# Patient Record
Sex: FEMALE | Race: WHITE | Employment: PART TIME | URBAN - METROPOLITAN AREA
[De-identification: names, ages, dates, MRNs, and addresses within clinical notes are randomized per-mention and may not be internally consistent; named-entity substitution may affect disease eponyms.]

---

## 2018-11-15 ENCOUNTER — OFFICE VISIT (OUTPATIENT)
Dept: OBGYN CLINIC | Age: 23
End: 2018-11-15
Payer: COMMERCIAL

## 2018-11-15 ENCOUNTER — HOSPITAL ENCOUNTER (OUTPATIENT)
Age: 23
Setting detail: SPECIMEN
Discharge: HOME | End: 2018-11-15
Payer: COMMERCIAL

## 2018-11-15 VITALS
SYSTOLIC BLOOD PRESSURE: 124 MMHG | WEIGHT: 242 LBS | BODY MASS INDEX: 37.98 KG/M2 | HEIGHT: 67 IN | DIASTOLIC BLOOD PRESSURE: 84 MMHG

## 2018-11-15 DIAGNOSIS — Z01.419 WOMEN'S ANNUAL ROUTINE GYNECOLOGICAL EXAMINATION: ICD-10-CM

## 2018-11-15 DIAGNOSIS — E28.2 PCOS (POLYCYSTIC OVARIAN SYNDROME): ICD-10-CM

## 2018-11-15 DIAGNOSIS — Z76.89 ENCOUNTER TO ESTABLISH CARE: Primary | ICD-10-CM

## 2018-11-15 PROCEDURE — 99385 PREV VISIT NEW AGE 18-39: CPT | Performed by: OBSTETRICS & GYNECOLOGY

## 2018-11-15 RX ORDER — ALPRAZOLAM 0.25 MG/1
0.25 TABLET ORAL
COMMUNITY
Start: 2018-07-26

## 2018-11-15 NOTE — PROGRESS NOTES
not tender. Cervix exhibits no motion tenderness, no discharge and no friability. Right adnexum displays no mass, no tenderness and no fullness. Left adnexum displays no mass, no tenderness and no fullness. No erythema or tenderness in the vagina. No foreign body in the vagina. No signs of injury around the vagina. No vaginal discharge found. Musculoskeletal: Normal range of motion. Neurological: She is alert and oriented to person, place, and time. Skin: Skin is warm and dry. Psychiatric: She has a normal mood and affect. Her behavior is normal. Judgment and thought content normal.           ASSESSMENT:     26 y.o. Female; Annual   Diagnosis Orders   1. Encounter to establish care     2. Women's annual routine gynecological examination  PAP Smear   3. PCOS (polycystic ovarian syndrome)  Hemoglobin A1C    Insulin, total    Lipid Panel     Return in about 1 year (around 11/15/2019) for annual exam.                PLAN:  - Pap collected per ASCCP Guidelines and sent for cytology only. - Birth control discussed. Will continue Lo-Estrin for now but discussed other OCPs as well as IUD for endometrial protection. Recommended scheduled NSAIDs starting 1 day prior to her menses. Will check Hg A1c, fasting insulin to determine need for metformin, as well as lipid panel.     - Smoking risk factors discussed  - Diet and exercise reviewed. - Routine health maintenance per patient's PCP.     Electronically signed by Wilian Armstrong MD on 11/15/18 at [facility]

## 2018-11-16 ENCOUNTER — HOSPITAL ENCOUNTER (OUTPATIENT)
Age: 23
Discharge: HOME | End: 2018-11-16
Payer: COMMERCIAL

## 2018-11-16 DIAGNOSIS — E28.2 PCOS (POLYCYSTIC OVARIAN SYNDROME): ICD-10-CM

## 2018-11-16 LAB
CHOLESTEROL/HDL RATIO: 4.7
CHOLESTEROL: 186 MG/DL
HDLC SERPL-MCNC: 40 MG/DL
INSULIN COMMENT: NORMAL
INSULIN REFERENCE RANGE:: NORMAL
INSULIN: 25.2 MU/L
LDL CHOLESTEROL: 128 MG/DL (ref 0–130)
TRIGL SERPL-MCNC: 90 MG/DL
VLDLC SERPL CALC-MCNC: ABNORMAL MG/DL (ref 1–30)

## 2018-11-16 PROCEDURE — 83036 HEMOGLOBIN GLYCOSYLATED A1C: CPT

## 2018-11-16 PROCEDURE — 36415 COLL VENOUS BLD VENIPUNCTURE: CPT

## 2018-11-16 PROCEDURE — 80061 LIPID PANEL: CPT

## 2018-11-16 PROCEDURE — 83525 ASSAY OF INSULIN: CPT

## 2018-11-16 ASSESSMENT — ENCOUNTER SYMPTOMS
COUGH: 0
VOMITING: 0
CONSTIPATION: 0
NAUSEA: 0
DIARRHEA: 0
WHEEZING: 0
ABDOMINAL PAIN: 0

## 2018-11-18 LAB
ESTIMATED AVERAGE GLUCOSE: 105 MG/DL
HBA1C MFR BLD: 5.3 % (ref 4–6)

## 2018-11-21 ENCOUNTER — TELEPHONE (OUTPATIENT)
Dept: OBGYN CLINIC | Age: 23
End: 2018-11-21

## 2018-11-23 RX ORDER — METFORMIN HYDROCHLORIDE 500 MG/1
500 TABLET, EXTENDED RELEASE ORAL
Qty: 30 TABLET | Refills: 5 | Status: SHIPPED | OUTPATIENT
Start: 2018-11-23

## 2018-12-03 LAB — CYTOLOGY REPORT: NORMAL

## 2019-02-05 ENCOUNTER — OFFICE VISIT (OUTPATIENT)
Dept: FAMILY MEDICINE CLINIC | Age: 24
End: 2019-02-05
Payer: COMMERCIAL

## 2019-02-05 VITALS
DIASTOLIC BLOOD PRESSURE: 72 MMHG | HEART RATE: 97 BPM | WEIGHT: 245 LBS | HEIGHT: 67 IN | SYSTOLIC BLOOD PRESSURE: 110 MMHG | BODY MASS INDEX: 38.45 KG/M2 | TEMPERATURE: 96.9 F | OXYGEN SATURATION: 98 %

## 2019-02-05 DIAGNOSIS — J02.9 SORE THROAT: ICD-10-CM

## 2019-02-05 DIAGNOSIS — J03.90 ACUTE TONSILLITIS, UNSPECIFIED ETIOLOGY: Primary | ICD-10-CM

## 2019-02-05 LAB
CONTROL: PRESENT
PREGNANCY TEST URINE, POC: NEGATIVE
S PYO AG THROAT QL: NORMAL

## 2019-02-05 PROCEDURE — 87880 STREP A ASSAY W/OPTIC: CPT | Performed by: NURSE PRACTITIONER

## 2019-02-05 PROCEDURE — 99203 OFFICE O/P NEW LOW 30 MIN: CPT | Performed by: NURSE PRACTITIONER

## 2019-02-05 PROCEDURE — 81025 URINE PREGNANCY TEST: CPT | Performed by: NURSE PRACTITIONER

## 2019-02-05 RX ORDER — PREDNISONE 20 MG/1
40 TABLET ORAL DAILY
Qty: 6 TABLET | Refills: 0 | Status: SHIPPED | OUTPATIENT
Start: 2019-02-05 | End: 2019-02-08

## 2019-02-05 RX ORDER — CLINDAMYCIN HYDROCHLORIDE 300 MG/1
300 CAPSULE ORAL 4 TIMES DAILY
Qty: 40 CAPSULE | Refills: 0 | Status: SHIPPED | OUTPATIENT
Start: 2019-02-05 | End: 2019-02-15

## 2019-02-05 ASSESSMENT — PATIENT HEALTH QUESTIONNAIRE - PHQ9
SUM OF ALL RESPONSES TO PHQ QUESTIONS 1-9: 0
SUM OF ALL RESPONSES TO PHQ9 QUESTIONS 1 & 2: 0
1. LITTLE INTEREST OR PLEASURE IN DOING THINGS: 0
2. FEELING DOWN, DEPRESSED OR HOPELESS: 0
SUM OF ALL RESPONSES TO PHQ QUESTIONS 1-9: 0

## 2019-02-05 ASSESSMENT — ENCOUNTER SYMPTOMS
VOMITING: 0
SWOLLEN GLANDS: 1
SORE THROAT: 1
COUGH: 0

## 2019-02-19 ENCOUNTER — OFFICE VISIT (OUTPATIENT)
Dept: FAMILY MEDICINE CLINIC | Age: 24
End: 2019-02-19
Payer: COMMERCIAL

## 2019-02-19 ENCOUNTER — TELEPHONE (OUTPATIENT)
Dept: FAMILY MEDICINE CLINIC | Age: 24
End: 2019-02-19

## 2019-02-19 VITALS
OXYGEN SATURATION: 98 % | WEIGHT: 243 LBS | BODY MASS INDEX: 38.14 KG/M2 | DIASTOLIC BLOOD PRESSURE: 82 MMHG | HEART RATE: 94 BPM | SYSTOLIC BLOOD PRESSURE: 124 MMHG | TEMPERATURE: 97.9 F | HEIGHT: 67 IN

## 2019-02-19 DIAGNOSIS — D22.9 CHANGE IN MOLE: ICD-10-CM

## 2019-02-19 DIAGNOSIS — Z11.4 ENCOUNTER FOR SCREENING FOR HIV: ICD-10-CM

## 2019-02-19 DIAGNOSIS — F32.9 MAJOR DEPRESSIVE DISORDER WITH CURRENT ACTIVE EPISODE, UNSPECIFIED DEPRESSION EPISODE SEVERITY, UNSPECIFIED WHETHER RECURRENT: ICD-10-CM

## 2019-02-19 DIAGNOSIS — Z00.00 PREVENTATIVE HEALTH CARE: ICD-10-CM

## 2019-02-19 DIAGNOSIS — F41.9 ANXIETY: Primary | ICD-10-CM

## 2019-02-19 DIAGNOSIS — Z83.3 FAMILY HISTORY OF DIABETES MELLITUS: ICD-10-CM

## 2019-02-19 DIAGNOSIS — E66.9 OBESITY WITH BODY MASS INDEX (BMI) OF 30.0 TO 39.9: ICD-10-CM

## 2019-02-19 DIAGNOSIS — Z11.8 SCREENING FOR CHLAMYDIAL DISEASE: ICD-10-CM

## 2019-02-19 DIAGNOSIS — E55.9 VITAMIN D DEFICIENCY: ICD-10-CM

## 2019-02-19 DIAGNOSIS — L40.9 PSORIASIS: ICD-10-CM

## 2019-02-19 LAB

## 2019-02-19 PROCEDURE — 99205 OFFICE O/P NEW HI 60 MIN: CPT | Performed by: NURSE PRACTITIONER

## 2019-02-19 PROCEDURE — 80305 DRUG TEST PRSMV DIR OPT OBS: CPT | Performed by: NURSE PRACTITIONER

## 2019-02-19 RX ORDER — ALPRAZOLAM 0.25 MG/1
TABLET ORAL
COMMUNITY
Start: 2019-02-13 | End: 2019-02-19

## 2019-02-19 ASSESSMENT — PATIENT HEALTH QUESTIONNAIRE - PHQ9
9. THOUGHTS THAT YOU WOULD BE BETTER OFF DEAD, OR OF HURTING YOURSELF: 0
SUM OF ALL RESPONSES TO PHQ QUESTIONS 1-9: 12
SUM OF ALL RESPONSES TO PHQ QUESTIONS 1-9: 12
1. LITTLE INTEREST OR PLEASURE IN DOING THINGS: 1
6. FEELING BAD ABOUT YOURSELF - OR THAT YOU ARE A FAILURE OR HAVE LET YOURSELF OR YOUR FAMILY DOWN: 0
5. POOR APPETITE OR OVEREATING: 2
SUM OF ALL RESPONSES TO PHQ9 QUESTIONS 1 & 2: 2
2. FEELING DOWN, DEPRESSED OR HOPELESS: 1
3. TROUBLE FALLING OR STAYING ASLEEP: 3
7. TROUBLE CONCENTRATING ON THINGS, SUCH AS READING THE NEWSPAPER OR WATCHING TELEVISION: 2
8. MOVING OR SPEAKING SO SLOWLY THAT OTHER PEOPLE COULD HAVE NOTICED. OR THE OPPOSITE, BEING SO FIGETY OR RESTLESS THAT YOU HAVE BEEN MOVING AROUND A LOT MORE THAN USUAL: 1
4. FEELING TIRED OR HAVING LITTLE ENERGY: 2

## 2019-02-19 ASSESSMENT — ENCOUNTER SYMPTOMS
BLOOD IN STOOL: 0
SHORTNESS OF BREATH: 0
COUGH: 0
EYE DISCHARGE: 0
ABDOMINAL PAIN: 0

## 2019-03-04 ENCOUNTER — HOSPITAL ENCOUNTER (OUTPATIENT)
Age: 24
Discharge: HOME | End: 2019-03-04
Payer: COMMERCIAL

## 2019-03-04 ENCOUNTER — OFFICE VISIT (OUTPATIENT)
Dept: DERMATOLOGY | Age: 24
End: 2019-03-04
Payer: COMMERCIAL

## 2019-03-04 VITALS
DIASTOLIC BLOOD PRESSURE: 63 MMHG | WEIGHT: 242.2 LBS | HEIGHT: 67 IN | OXYGEN SATURATION: 98 % | SYSTOLIC BLOOD PRESSURE: 102 MMHG | BODY MASS INDEX: 38.01 KG/M2 | HEART RATE: 79 BPM

## 2019-03-04 DIAGNOSIS — L40.9 PSORIASIS: Primary | ICD-10-CM

## 2019-03-04 DIAGNOSIS — Z79.899 HIGH RISK MEDICATION USE: ICD-10-CM

## 2019-03-04 DIAGNOSIS — D18.01 CHERRY ANGIOMA: ICD-10-CM

## 2019-03-04 DIAGNOSIS — Z11.4 ENCOUNTER FOR SCREENING FOR HIV: ICD-10-CM

## 2019-03-04 DIAGNOSIS — L40.9 PSORIASIS: ICD-10-CM

## 2019-03-04 PROCEDURE — 82565 ASSAY OF CREATININE: CPT

## 2019-03-04 PROCEDURE — 85025 COMPLETE CBC W/AUTO DIFF WBC: CPT

## 2019-03-04 PROCEDURE — G8427 DOCREV CUR MEDS BY ELIG CLIN: HCPCS | Performed by: DERMATOLOGY

## 2019-03-04 PROCEDURE — 84460 ALANINE AMINO (ALT) (SGPT): CPT

## 2019-03-04 PROCEDURE — 99203 OFFICE O/P NEW LOW 30 MIN: CPT | Performed by: DERMATOLOGY

## 2019-03-04 PROCEDURE — 80074 ACUTE HEPATITIS PANEL: CPT

## 2019-03-04 PROCEDURE — G8417 CALC BMI ABV UP PARAM F/U: HCPCS | Performed by: DERMATOLOGY

## 2019-03-04 PROCEDURE — G8484 FLU IMMUNIZE NO ADMIN: HCPCS | Performed by: DERMATOLOGY

## 2019-03-04 PROCEDURE — 84520 ASSAY OF UREA NITROGEN: CPT

## 2019-03-04 PROCEDURE — 84703 CHORIONIC GONADOTROPIN ASSAY: CPT

## 2019-03-04 PROCEDURE — 87389 HIV-1 AG W/HIV-1&-2 AB AG IA: CPT

## 2019-03-04 PROCEDURE — 84450 TRANSFERASE (AST) (SGOT): CPT

## 2019-03-04 PROCEDURE — 1036F TOBACCO NON-USER: CPT | Performed by: DERMATOLOGY

## 2019-03-05 ENCOUNTER — HOSPITAL ENCOUNTER (OUTPATIENT)
Age: 24
Discharge: HOME | End: 2019-03-05
Payer: COMMERCIAL

## 2019-03-05 DIAGNOSIS — L40.9 PSORIASIS: ICD-10-CM

## 2019-03-05 LAB
ABSOLUTE EOS #: 0.19 K/UL (ref 0–0.44)
ABSOLUTE IMMATURE GRANULOCYTE: 0.03 K/UL (ref 0–0.3)
ABSOLUTE LYMPH #: 1.49 K/UL (ref 1.1–3.7)
ABSOLUTE MONO #: 0.5 K/UL (ref 0.1–1.2)
ALT SERPL-CCNC: 32 U/L (ref 5–33)
AST SERPL-CCNC: 20 U/L
BASOPHILS # BLD: 1 % (ref 0–2)
BASOPHILS ABSOLUTE: 0.04 K/UL (ref 0–0.2)
BUN BLDV-MCNC: 15 MG/DL (ref 6–20)
CREAT SERPL-MCNC: 0.65 MG/DL (ref 0.5–0.9)
DIFFERENTIAL TYPE: ABNORMAL
EOSINOPHILS RELATIVE PERCENT: 3 % (ref 1–4)
GFR AFRICAN AMERICAN: >60 ML/MIN
GFR NON-AFRICAN AMERICAN: >60 ML/MIN
GFR SERPL CREATININE-BSD FRML MDRD: NORMAL ML/MIN/{1.73_M2}
GFR SERPL CREATININE-BSD FRML MDRD: NORMAL ML/MIN/{1.73_M2}
HAV IGM SER IA-ACNC: NONREACTIVE
HCG QUANTITATIVE: <1 IU/L
HCT VFR BLD CALC: 41.8 % (ref 36.3–47.1)
HEMOGLOBIN: 13.3 G/DL (ref 11.9–15.1)
HEPATITIS B CORE IGM ANTIBODY: NONREACTIVE
HEPATITIS B SURFACE ANTIGEN: NONREACTIVE
HEPATITIS C ANTIBODY: NONREACTIVE
HIV AG/AB: NONREACTIVE
IMMATURE GRANULOCYTES: 1 %
LYMPHOCYTES # BLD: 23 % (ref 24–43)
MCH RBC QN AUTO: 30 PG (ref 25.2–33.5)
MCHC RBC AUTO-ENTMCNC: 31.8 G/DL (ref 28.4–34.8)
MCV RBC AUTO: 94.4 FL (ref 82.6–102.9)
MONOCYTES # BLD: 8 % (ref 3–12)
NRBC AUTOMATED: 0 PER 100 WBC
PDW BLD-RTO: 12.8 % (ref 11.8–14.4)
PLATELET # BLD: 353 K/UL (ref 138–453)
PLATELET ESTIMATE: ABNORMAL
PMV BLD AUTO: 10.8 FL (ref 8.1–13.5)
RBC # BLD: 4.43 M/UL (ref 3.95–5.11)
RBC # BLD: ABNORMAL 10*6/UL
SEG NEUTROPHILS: 64 % (ref 36–65)
SEGMENTED NEUTROPHILS ABSOLUTE COUNT: 4.25 K/UL (ref 1.5–8.1)
WBC # BLD: 6.5 K/UL (ref 3.5–11.3)
WBC # BLD: ABNORMAL 10*3/UL

## 2019-03-05 PROCEDURE — 36415 COLL VENOUS BLD VENIPUNCTURE: CPT

## 2019-03-05 PROCEDURE — 87389 HIV-1 AG W/HIV-1&-2 AB AG IA: CPT

## 2019-03-05 PROCEDURE — 80074 ACUTE HEPATITIS PANEL: CPT

## 2019-03-05 PROCEDURE — 84460 ALANINE AMINO (ALT) (SGPT): CPT

## 2019-03-05 PROCEDURE — 85025 COMPLETE CBC W/AUTO DIFF WBC: CPT

## 2019-03-05 PROCEDURE — 84520 ASSAY OF UREA NITROGEN: CPT

## 2019-03-05 PROCEDURE — 84450 TRANSFERASE (AST) (SGOT): CPT

## 2019-03-05 PROCEDURE — 86480 TB TEST CELL IMMUN MEASURE: CPT

## 2019-03-05 PROCEDURE — 84702 CHORIONIC GONADOTROPIN TEST: CPT

## 2019-03-05 PROCEDURE — 82565 ASSAY OF CREATININE: CPT

## 2019-03-06 ENCOUNTER — TELEPHONE (OUTPATIENT)
Dept: DERMATOLOGY | Age: 24
End: 2019-03-06

## 2019-03-07 LAB
QUANTI TB GOLD PLUS: NEGATIVE
QUANTI TB1 MINUS NIL: 0 IU/ML (ref 0–0.34)
QUANTI TB2 MINUS NIL: 0 IU/ML (ref 0–0.34)
QUANTIFERON MITOGEN: 6.91 IU/ML
QUANTIFERON NIL: 0.02 IU/ML

## 2019-03-08 ENCOUNTER — TELEPHONE (OUTPATIENT)
Dept: DERMATOLOGY | Age: 24
End: 2019-03-08

## 2019-03-08 RX ORDER — FOLIC ACID 1 MG/1
1 TABLET ORAL DAILY
Qty: 30 TABLET | Refills: 0 | Status: SHIPPED | OUTPATIENT
Start: 2019-03-08 | End: 2019-07-29

## 2019-03-22 ENCOUNTER — TELEPHONE (OUTPATIENT)
Dept: DERMATOLOGY | Age: 24
End: 2019-03-22

## 2019-07-29 ENCOUNTER — OFFICE VISIT (OUTPATIENT)
Dept: FAMILY MEDICINE CLINIC | Age: 24
End: 2019-07-29
Payer: COMMERCIAL

## 2019-07-29 VITALS
DIASTOLIC BLOOD PRESSURE: 76 MMHG | SYSTOLIC BLOOD PRESSURE: 110 MMHG | RESPIRATION RATE: 16 BRPM | TEMPERATURE: 97.8 F | HEART RATE: 103 BPM | BODY MASS INDEX: 37.59 KG/M2 | HEIGHT: 67 IN | WEIGHT: 239.5 LBS

## 2019-07-29 DIAGNOSIS — F32.9 MAJOR DEPRESSIVE DISORDER WITH CURRENT ACTIVE EPISODE, UNSPECIFIED DEPRESSION EPISODE SEVERITY, UNSPECIFIED WHETHER RECURRENT: ICD-10-CM

## 2019-07-29 DIAGNOSIS — F41.9 ANXIETY: Primary | ICD-10-CM

## 2019-07-29 DIAGNOSIS — Z13.31 POSITIVE DEPRESSION SCREENING: ICD-10-CM

## 2019-07-29 PROCEDURE — 1036F TOBACCO NON-USER: CPT | Performed by: PHYSICIAN ASSISTANT

## 2019-07-29 PROCEDURE — G8427 DOCREV CUR MEDS BY ELIG CLIN: HCPCS | Performed by: PHYSICIAN ASSISTANT

## 2019-07-29 PROCEDURE — G8417 CALC BMI ABV UP PARAM F/U: HCPCS | Performed by: PHYSICIAN ASSISTANT

## 2019-07-29 PROCEDURE — G8431 POS CLIN DEPRES SCRN F/U DOC: HCPCS | Performed by: PHYSICIAN ASSISTANT

## 2019-07-29 PROCEDURE — 99214 OFFICE O/P EST MOD 30 MIN: CPT | Performed by: PHYSICIAN ASSISTANT

## 2019-07-29 RX ORDER — SERTRALINE HYDROCHLORIDE 100 MG/1
100 TABLET, FILM COATED ORAL DAILY
Qty: 30 TABLET | Refills: 5 | Status: SHIPPED | OUTPATIENT
Start: 2019-07-29 | End: 2020-07-17

## 2019-07-29 RX ORDER — SERTRALINE HYDROCHLORIDE 100 MG/1
100 TABLET, FILM COATED ORAL DAILY
Qty: 30 TABLET | Refills: 5 | Status: SHIPPED | OUTPATIENT
Start: 2019-07-29 | End: 2019-07-29

## 2019-07-29 ASSESSMENT — ENCOUNTER SYMPTOMS
WHEEZING: 0
CONSTIPATION: 0
BLOOD IN STOOL: 0
VOMITING: 0
ABDOMINAL PAIN: 0
SHORTNESS OF BREATH: 0
CHEST TIGHTNESS: 0
NAUSEA: 0
SORE THROAT: 0
COUGH: 0
BACK PAIN: 0
DIARRHEA: 0
SINUS PAIN: 0

## 2019-07-29 NOTE — PROGRESS NOTES
Visit Information    Have you changed or started any medications since your last visit including any over-the-counter medicines, vitamins, or herbal medicines? no   Are you having any side effects from any of your medications? -  no  Have you stopped taking any of your medications? Is so, why? -  no    Have you seen any other physician or provider since your last visit? No  Have you had any other diagnostic tests since your last visit? No  Have you been seen in the emergency room and/or had an admission to a hospital since we last saw you? No  Have you had your routine dental cleaning in the past 6 months? no    Have you activated your [employer] account? If not, what are your barriers?  Yes     Patient Care Team:  CARLINE Vizcaino CNP as PCP - General (Certified Nurse Practitioner)  CARLINE Vizcaino CNP as PCP - [facility] Provider    Medical History Review  Past Medical, Family, and Social History reviewed and does not contribute to the patient presenting condition    Health Maintenance   Topic Date Due    Varicella Vaccine (2 of 2 - 2-dose childhood series) 08/15/2000    HPV vaccine (1 - Female 3-dose series) 08/15/2011    Chlamydia screen  08/15/2012    Flu vaccine (1) 09/01/2019    DTaP/Tdap/Td vaccine (7 - Td) 03/11/2020    Cervical cancer screen  11/15/2021    HIV screen  Completed    Pneumococcal 0-64 years Vaccine  Aged Out
screen  08/15/2012       No Known Allergies      Current Outpatient Medications   Medication Sig Dispense Refill    sertraline (ZOLOFT) 100 MG tablet Take 1 tablet by mouth daily 30 tablet 5    ALPRAZolam (XANAX) 0.25 MG tablet Take 0.25 mg by mouth. Clarence Emircharlotte norethindrone-ethinyl estradiol-Fe (LO LOESTRIN FE) 1 MG-10 MCG / 10 MCG tablet Take 1 tablet by mouth daily 28 tablet 11    metFORMIN (GLUCOPHAGE-XR) 500 MG extended release tablet Take 1 tablet by mouth daily (with breakfast) (Patient not taking: Reported on 7/29/2019) 30 tablet 5     No current facility-administered medications for this visit. Social History     Tobacco Use    Smoking status: Never Smoker    Smokeless tobacco: Never Used   Substance Use Topics    Alcohol use: Yes     Comment: social    Drug use: No       Family History   Problem Relation Age of Onset    Diabetes Mother     Cancer Mother         Cervical - Endometrial    Cancer Maternal Grandfather         Skin    Cancer Paternal Grandmother         Bone        Review of Systems   Constitutional: Negative for chills, diaphoresis, fatigue, fever and unexpected weight change. HENT: Negative for sinus pain and sore throat. Respiratory: Negative for cough, chest tightness, shortness of breath and wheezing. Cardiovascular: Negative for chest pain, palpitations and leg swelling. Gastrointestinal: Negative for abdominal pain, blood in stool, constipation, diarrhea, nausea and vomiting. Genitourinary: Negative for dysuria, frequency and urgency. Musculoskeletal: Negative for back pain and myalgias. Neurological: Negative for dizziness, weakness and headaches. Psychiatric/Behavioral: Positive for dysphoric mood. Negative for agitation, behavioral problems, confusion, decreased concentration, hallucinations, self-injury, sleep disturbance and suicidal ideas. The patient is nervous/anxious. The patient is not hyperactive.             Physical Exam   Constitutional: She

## 2019-08-13 ENCOUNTER — TELEPHONE (OUTPATIENT)
Dept: FAMILY MEDICINE CLINIC | Age: 24
End: 2019-08-13

## 2019-08-13 ENCOUNTER — OFFICE VISIT (OUTPATIENT)
Dept: OBGYN CLINIC | Age: 24
End: 2019-08-13
Payer: COMMERCIAL

## 2019-08-13 VITALS
SYSTOLIC BLOOD PRESSURE: 126 MMHG | BODY MASS INDEX: 37.2 KG/M2 | DIASTOLIC BLOOD PRESSURE: 80 MMHG | WEIGHT: 237 LBS | HEIGHT: 67 IN

## 2019-08-13 DIAGNOSIS — Z83.3 FAMILY HISTORY OF DIABETES MELLITUS: Primary | ICD-10-CM

## 2019-08-13 DIAGNOSIS — E66.9 OBESITY WITH BODY MASS INDEX (BMI) OF 30.0 TO 39.9: ICD-10-CM

## 2019-08-13 DIAGNOSIS — E28.2 PCOS (POLYCYSTIC OVARIAN SYNDROME): ICD-10-CM

## 2019-08-13 PROCEDURE — G8417 CALC BMI ABV UP PARAM F/U: HCPCS | Performed by: OBSTETRICS & GYNECOLOGY

## 2019-08-13 PROCEDURE — 99213 OFFICE O/P EST LOW 20 MIN: CPT | Performed by: OBSTETRICS & GYNECOLOGY

## 2019-08-13 PROCEDURE — G8427 DOCREV CUR MEDS BY ELIG CLIN: HCPCS | Performed by: OBSTETRICS & GYNECOLOGY

## 2019-08-13 PROCEDURE — 1036F TOBACCO NON-USER: CPT | Performed by: OBSTETRICS & GYNECOLOGY

## 2019-08-13 RX ORDER — NORETHINDRONE ACETATE AND ETHINYL ESTRADIOL .03; 1.5 MG/1; MG/1
1 TABLET ORAL DAILY
Qty: 21 TABLET | Refills: 11 | Status: SHIPPED | OUTPATIENT
Start: 2019-08-13

## 2019-08-13 ASSESSMENT — ENCOUNTER SYMPTOMS
ABDOMINAL PAIN: 0
COUGH: 0
CONSTIPATION: 0
WHEEZING: 0
DIARRHEA: 0
VOMITING: 0
NAUSEA: 0

## 2019-08-13 NOTE — PROGRESS NOTES
Take 1 tablet by mouth daily (with breakfast) (Patient not taking: Reported on 2019) 30 tablet 5     No current facility-administered medications for this visit. Allergies:  Patient has no known allergies. Gynecologic History:  Patient's last menstrual period was 2019. Sexually Active: No  STD History: No      OB History    Para Term  AB Living   0 0 0 0 0 0   SAB TAB Ectopic Molar Multiple Live Births   0 0 0 0 0 0       Review of Systems   Constitutional: Negative for chills and fever. HENT: Negative for hearing loss. Respiratory: Negative for cough and wheezing. Cardiovascular: Negative for chest pain and palpitations. Gastrointestinal: Negative for abdominal pain, constipation, diarrhea, nausea and vomiting. Genitourinary: Negative for dysuria, frequency and urgency. Musculoskeletal: Negative for myalgias. Skin: Negative for rash. Neurological: Negative for dizziness, weakness and headaches. Hematological: Does not bruise/bleed easily. Psychiatric/Behavioral: Negative for suicidal ideas. /80 (Position: Sitting, Cuff Size: Large Adult)   Ht 5' 7\" (1.702 m)   Wt 237 lb (107.5 kg)   LMP 2019   BMI 37.12 kg/m²     Physical Exam   Constitutional: She is oriented to person, place, and time. She appears well-developed and well-nourished. HENT:   Head: Normocephalic. Eyes: EOM are normal.   Neck: Normal range of motion. No thyromegaly present. Cardiovascular: Normal rate and regular rhythm. Pulmonary/Chest: Effort normal. No respiratory distress. Abdominal: Soft. She exhibits no distension. There is no tenderness. Musculoskeletal: Normal range of motion. Neurological: She is alert and oriented to person, place, and time. Skin: Skin is warm and dry. Psychiatric: She has a normal mood and affect.  Her behavior is normal. Judgment and thought content normal.       ASSESSMENT:  Stacey Covarrubias is a 26 y.o. female      ICD-10-CM

## 2019-12-17 ENCOUNTER — TELEPHONE (OUTPATIENT)
Dept: OBGYN CLINIC | Age: 24
End: 2019-12-17

## 2020-07-17 RX ORDER — SERTRALINE HYDROCHLORIDE 100 MG/1
100 TABLET, FILM COATED ORAL DAILY
Qty: 30 TABLET | Refills: 5 | Status: SHIPPED | OUTPATIENT
Start: 2020-07-17 | End: 2020-08-16

## 2020-07-17 NOTE — TELEPHONE ENCOUNTER
Next Visit Date:  No future appointments.     Health Maintenance   Topic Date Due    Varicella vaccine (2 of 2 - 2-dose childhood series) 08/15/2000    HPV vaccine (1 - 2-dose series) 08/15/2007    Chlamydia screen  08/15/2012    DTaP/Tdap/Td vaccine (7 - Td) 03/11/2020    Flu vaccine (1) 09/01/2020    Cervical cancer screen  11/15/2021    Hepatitis B vaccine  Completed    Hib vaccine  Completed    HIV screen  Completed    Hepatitis A vaccine  Aged Out    Meningococcal (ACWY) vaccine  Aged Out    Pneumococcal 0-64 years Vaccine  Aged Out       Hemoglobin A1C (%)   Date Value   11/16/2018 5.3             ( goal A1C is < 7)   No results found for: LABMICR  LDL Cholesterol (mg/dL)   Date Value   11/16/2018 128       (goal LDL is <100)   AST (U/L)   Date Value   03/05/2019 20     ALT (U/L)   Date Value   03/05/2019 32     BUN (mg/dL)   Date Value   03/05/2019 15     BP Readings from Last 3 Encounters:   08/13/19 126/80   07/29/19 110/76   03/04/19 102/63          (goal 120/80)    All Future Testing planned in [facility]  Lab Frequency Next Occurrence               Patient Active Problem List:     Family history of diabetes mellitus     Anxiety     Depression     Vitamin D deficiency     Psoriasis     Obesity with body mass index (BMI) of 30.0 to 39.9

## 2023-04-22 ENCOUNTER — APPOINTMENT (OUTPATIENT)
Dept: GENERAL RADIOLOGY | Facility: CLINIC | Age: 28
End: 2023-04-22
Payer: COMMERCIAL

## 2023-04-22 ENCOUNTER — HOSPITAL ENCOUNTER (EMERGENCY)
Facility: CLINIC | Age: 28
Discharge: HOME | End: 2023-04-22
Attending: EMERGENCY MEDICINE
Payer: COMMERCIAL

## 2023-04-22 VITALS
SYSTOLIC BLOOD PRESSURE: 118 MMHG | HEART RATE: 105 BPM | RESPIRATION RATE: 20 BRPM | OXYGEN SATURATION: 100 % | DIASTOLIC BLOOD PRESSURE: 84 MMHG | TEMPERATURE: 98 F

## 2023-04-22 DIAGNOSIS — F41.1 ANXIETY REACTION: ICD-10-CM

## 2023-04-22 DIAGNOSIS — R00.2 PALPITATIONS: Primary | ICD-10-CM

## 2023-04-22 LAB
ABSOLUTE EOS #: 0.3 K/UL (ref 0–0.4)
ABSOLUTE LYMPH #: 2.2 K/UL (ref 1–4.8)
ABSOLUTE MONO #: 0.7 K/UL (ref 0.1–1.2)
ANION GAP SERPL CALCULATED.3IONS-SCNC: 11 MMOL/L (ref 9–17)
BASOPHILS # BLD: 1 % (ref 0–2)
BASOPHILS ABSOLUTE: 0.1 K/UL (ref 0–0.2)
BUN SERPL-MCNC: 13 MG/DL (ref 6–20)
CALCIUM SERPL-MCNC: 9.6 MG/DL (ref 8.6–10.4)
CHLORIDE SERPL-SCNC: 99 MMOL/L (ref 98–107)
CO2 SERPL-SCNC: 25 MMOL/L (ref 20–31)
CREAT SERPL-MCNC: 0.4 MG/DL (ref 0.5–0.9)
D DIMER BLD IA.RAPID-MCNC: 0.24 UG/ML FEU
EOSINOPHILS RELATIVE PERCENT: 2 % (ref 1–4)
GFR SERPL CREATININE-BSD FRML MDRD: >60 ML/MIN/1.73M2
GLUCOSE SERPL-MCNC: 119 MG/DL (ref 70–99)
HCG QUALITATIVE: NEGATIVE
HCT VFR BLD AUTO: 38.8 % (ref 36–46)
HGB BLD-MCNC: 12.8 G/DL (ref 12–16)
LYMPHOCYTES # BLD: 16 % (ref 24–44)
MCH RBC QN AUTO: 28 PG (ref 26–34)
MCHC RBC AUTO-ENTMCNC: 33 G/DL (ref 31–37)
MCV RBC AUTO: 84.7 FL (ref 80–100)
MONOCYTES # BLD: 5 % (ref 2–11)
PDW BLD-RTO: 13.3 % (ref 12.5–15.4)
PLATELET # BLD AUTO: 369 K/UL (ref 140–450)
PMV BLD AUTO: 7.8 FL (ref 6–12)
POTASSIUM SERPL-SCNC: 4.1 MMOL/L (ref 3.7–5.3)
RBC # BLD: 4.57 M/UL (ref 4–5.2)
SEG NEUTROPHILS: 76 % (ref 36–66)
SEGMENTED NEUTROPHILS ABSOLUTE COUNT: 10.8 K/UL (ref 1.8–7.7)
SODIUM SERPL-SCNC: 135 MMOL/L (ref 135–144)
TROPONIN I SERPL DL<=0.01 NG/ML-MCNC: <6 NG/L (ref 0–14)
WBC # BLD AUTO: 14 K/UL (ref 3.5–11)

## 2023-04-22 PROCEDURE — 36415 COLL VENOUS BLD VENIPUNCTURE: CPT

## 2023-04-22 PROCEDURE — 85025 COMPLETE CBC W/AUTO DIFF WBC: CPT

## 2023-04-22 PROCEDURE — 84484 ASSAY OF TROPONIN QUANT: CPT

## 2023-04-22 PROCEDURE — 84703 CHORIONIC GONADOTROPIN ASSAY: CPT

## 2023-04-22 PROCEDURE — 80048 BASIC METABOLIC PNL TOTAL CA: CPT

## 2023-04-22 PROCEDURE — 71045 X-RAY EXAM CHEST 1 VIEW: CPT

## 2023-04-22 PROCEDURE — 93005 ELECTROCARDIOGRAM TRACING: CPT | Performed by: EMERGENCY MEDICINE

## 2023-04-22 PROCEDURE — 85379 FIBRIN DEGRADATION QUANT: CPT

## 2023-04-22 PROCEDURE — 99285 EMERGENCY DEPT VISIT HI MDM: CPT

## 2023-04-22 RX ORDER — DEUCRAVACITINIB 6 MG/1
6 TABLET, FILM COATED ORAL DAILY
COMMUNITY

## 2023-04-22 RX ORDER — CETIRIZINE HYDROCHLORIDE 10 MG/1
10 TABLET ORAL DAILY
COMMUNITY

## 2023-04-22 ASSESSMENT — ENCOUNTER SYMPTOMS
BACK PAIN: 0
NAUSEA: 0
SORE THROAT: 0
SHORTNESS OF BREATH: 0
DIARRHEA: 0
VOMITING: 0
WHEEZING: 0
COUGH: 0
ABDOMINAL PAIN: 0

## 2023-04-22 NOTE — ED PROVIDER NOTES
[facility] ED  [address]  Phone: [phone]    eMERGENCY dEPARTMENT eNCOUnter        Pt Name: Mandie Chong  MRN: [id number]  [place] 1995  Date of evaluation: 4/22/23    CHIEF COMPLAINT     Chief Complaint   Patient presents with    Palpitations     Feels like heart just stops, take breathe away, achey feeling in chest, started Thursday, prior hx of wearing heart monitor for palpitations in the past, hx of sinus tachy       HISTORY OF PRESENT ILLNESS    Mandie Chong is a 33 y.o. female who presents to the emergency department accompanied by her significant other with palpitations. Patient admits this has been an ongoing intermittent problem. It appears in February she had a Holter monitor that did not reveal any acute process of concern. Patient has been under tremendous stress she just took her boards for [employer] and passed. As result they went on vacation to [place] for a week for which she just returned to today at which point she flew in to [facility] and they drove straight here with the concerns of the palpitations. The patient admits this is anxiety and stress. She became very tearful during the examination. Her  is at the bedside trying to reassure her and agrees and admits that this is all stress and anxiety. He stated she sees a therapist weekly and has been on and off medications and is trying to do without medications at the present time. Patient denies any fever chills cough congestion chest pain or shortness of breath. No abdominal pain nausea vomiting diarrhea. No leg pain or swelling. No recent injury or trauma. She is COVID vaccinated with a booster. REVIEW OF SYSTEMS     Review of Systems   Constitutional:  Negative for chills and fever. HENT:  Negative for congestion, ear pain and sore throat. Respiratory:  Negative for cough, shortness of breath and wheezing.     Cardiovascular:  Negative for chest pain, palpitations and leg

## 2023-04-24 LAB
EKG ATRIAL RATE: 108 BPM
EKG P AXIS: 29 DEGREES
EKG P-R INTERVAL: 130 MS
EKG Q-T INTERVAL: 340 MS
EKG QRS DURATION: 80 MS
EKG QTC CALCULATION (BAZETT): 455 MS
EKG R AXIS: 21 DEGREES
EKG T AXIS: 21 DEGREES
EKG VENTRICULAR RATE: 108 BPM